# Patient Record
Sex: FEMALE | Race: BLACK OR AFRICAN AMERICAN | URBAN - METROPOLITAN AREA
[De-identification: names, ages, dates, MRNs, and addresses within clinical notes are randomized per-mention and may not be internally consistent; named-entity substitution may affect disease eponyms.]

---

## 2022-04-18 ENCOUNTER — OFFICE VISIT (OUTPATIENT)
Dept: FAMILY MEDICINE CLINIC | Facility: CLINIC | Age: 32
End: 2022-04-18
Payer: COMMERCIAL

## 2022-04-18 VITALS
HEART RATE: 76 BPM | BODY MASS INDEX: 21.35 KG/M2 | HEIGHT: 67 IN | OXYGEN SATURATION: 98 % | DIASTOLIC BLOOD PRESSURE: 72 MMHG | RESPIRATION RATE: 19 BRPM | SYSTOLIC BLOOD PRESSURE: 125 MMHG | WEIGHT: 136 LBS

## 2022-04-18 DIAGNOSIS — F10.10 ALCOHOL ABUSE: ICD-10-CM

## 2022-04-18 DIAGNOSIS — Z00.00 ANNUAL PHYSICAL EXAM: Primary | ICD-10-CM

## 2022-04-18 DIAGNOSIS — F41.9 ANXIETY: ICD-10-CM

## 2022-04-18 LAB
ALBUMIN SERPL-MCNC: 3.8 G/DL (ref 3.4–5)
ALBUMIN/GLOB SERPL: 0.9 {RATIO} (ref 1–2)
ALP LIVER SERPL-CCNC: 60 U/L
ALT SERPL-CCNC: 21 U/L
ANION GAP SERPL CALC-SCNC: 7 MMOL/L (ref 0–18)
AST SERPL-CCNC: 20 U/L (ref 15–37)
BILIRUB SERPL-MCNC: 0.5 MG/DL (ref 0.1–2)
BUN BLD-MCNC: 7 MG/DL (ref 7–18)
BUN/CREAT SERPL: 8.4 (ref 10–20)
CALCIUM BLD-MCNC: 9.2 MG/DL (ref 8.5–10.1)
CHLORIDE SERPL-SCNC: 109 MMOL/L (ref 98–112)
CHOLEST SERPL-MCNC: 271 MG/DL (ref ?–200)
CO2 SERPL-SCNC: 27 MMOL/L (ref 21–32)
CREAT BLD-MCNC: 0.83 MG/DL
FASTING PATIENT LIPID ANSWER: NO
FASTING STATUS PATIENT QL REPORTED: NO
GLOBULIN PLAS-MCNC: 4.2 G/DL (ref 2.8–4.4)
GLUCOSE BLD-MCNC: 75 MG/DL (ref 70–99)
HDLC SERPL-MCNC: 125 MG/DL (ref 40–59)
LDLC SERPL CALC-MCNC: 133 MG/DL (ref ?–100)
NONHDLC SERPL-MCNC: 146 MG/DL (ref ?–130)
OSMOLALITY SERPL CALC.SUM OF ELEC: 293 MOSM/KG (ref 275–295)
POTASSIUM SERPL-SCNC: 3.9 MMOL/L (ref 3.5–5.1)
PROT SERPL-MCNC: 8 G/DL (ref 6.4–8.2)
SODIUM SERPL-SCNC: 143 MMOL/L (ref 136–145)
TRIGL SERPL-MCNC: 81 MG/DL (ref 30–149)
VLDLC SERPL CALC-MCNC: 15 MG/DL (ref 0–30)

## 2022-04-18 PROCEDURE — 80053 COMPREHEN METABOLIC PANEL: CPT | Performed by: FAMILY MEDICINE

## 2022-04-18 PROCEDURE — 80061 LIPID PANEL: CPT | Performed by: FAMILY MEDICINE

## 2022-04-18 NOTE — PROGRESS NOTES
HPI:    Justin Jefferson is a 36year old female presents to clinic as a new patient to establish care and for annual physical exam.  No acute concerns. Normal appetite, tries eat healthy foods. Normal bowel movements and urination. Normal sleep habits. Exercises regularly. Drinks 4-5 shots of alcohol per evening. Reports this started after the loss of her grandmother, also states it helps with work stress. Denies tobacco or illicit drug use. Patient's last menstrual period was 04/15/2022 (exact date). Regular cycles, no concerns. Not sexually active. Has not had a Pap smear. Works at [employer]. HISTORY:  History reviewed. No pertinent past medical history. History reviewed. No pertinent surgical history. History reviewed. No pertinent family history. Social History: Social History    Tobacco Use      Smoking status: Former Smoker        Quit date: 2022        Years since quittin.0      Smokeless tobacco: Former User    Vaping Use      Vaping Use: Every day        Substances: Flavoring    Alcohol use: Yes      Alcohol/week: 30.0 standard drinks      Types: 30 Shots of liquor per week      Comment: Everyday Drinker    Drug use: Not Currently      Types: Cannabis       Medications (Active prior to today's visit):  No current outpatient medications on file. Allergies:  Not on File      Depression Screening (PHQ-2/PHQ-9): Over the LAST 2 WEEKS   Little interest or pleasure in doing things: Not at all    Feeling down, depressed, or hopeless: Not at all    PHQ-2 SCORE: 0           ROS:   Review of Systems   All other systems reviewed and are negative. PHYSICAL EXAM:      22  1606   BP: 125/72   BP Location: Right arm   Patient Position: Sitting   Pulse: 76   Resp: 19   SpO2: 98%   Weight: 136 lb (61.7 kg)   Height: 5' 7\" (1.702 m)     Physical Exam  Vitals reviewed. Constitutional:       General: She is not in acute distress.   HENT:      Head: Normocephalic and atraumatic. Right Ear: Tympanic membrane, ear canal and external ear normal.      Left Ear: Tympanic membrane, ear canal and external ear normal.      Nose: Nose normal.      Mouth/Throat:      Pharynx: Uvula midline. Eyes:      Conjunctiva/sclera: Conjunctivae normal.      Pupils: Pupils are equal, round, and reactive to light. Neck:      Thyroid: No thyromegaly. Cardiovascular:      Rate and Rhythm: Normal rate and regular rhythm. Heart sounds: Normal heart sounds. No murmur heard. Pulmonary:      Effort: Pulmonary effort is normal. No respiratory distress. Breath sounds: Normal breath sounds. No wheezing or rales. Chest:   Breasts:      Right: Normal. No swelling, bleeding, inverted nipple, mass, nipple discharge, skin change or tenderness. Left: Normal. No swelling, bleeding, inverted nipple, mass, nipple discharge, skin change or tenderness. Abdominal:      General: Bowel sounds are normal. There is no distension. Palpations: Abdomen is soft. Tenderness: There is no abdominal tenderness. There is no guarding or rebound. Musculoskeletal:      Cervical back: Normal range of motion and neck supple. Lymphadenopathy:      Cervical: No cervical adenopathy. Neurological:      Mental Status: She is alert. ASSESSMENT/PLAN:   (Z00.00) Annual physical exam  (primary encounter diagnosis)  Plan: TETANUS, DIPHTHERIA TOXOIDS AND ACELLULAR         PERTUSIS VACCINE (TDAP), >7 YEARS, IM USE, COMP        METABOLIC PANEL (14), LIPID PANEL, LIPID PANEL,        COMP METABOLIC PANEL (14)  - Tdap given today. - tobacco, alcohol, illicit drug use discouraged  - safe sexual practices advised  - Reinforced healthy diet, lifestyle, and exercise.   - Past Medical/Social/Family histories reviewed  - Regular dental visits recommended   - Regular eye exams recommended     Follow up in 1 year or sooner if needed      (F10.10) Alcohol abuse  (F41.9) Anxiety  Plan: [facility] NAVIGATOR  -Could benefit from therapy to help with anxiety/excessive alcohol consumption. Yolanda Varela behavioral health navigator referral placed. Responsible party/patient verbalized understanding of information discussed. No barriers to learning observed. Orders This Visit:  Orders Placed This Encounter      Comp Metabolic Panel (14) [E]      Lipid Panel [E]      TETANUS, DIPHTHERIA TOXOIDS AND ACELLULAR PERTUSIS VACCINE (TDAP), >7 YEARS, IM USE      Meds This Visit:  Requested Prescriptions      No prescriptions requested or ordered in this encounter       Imaging & Referrals:   NAVIGATOR  TETANUS, DIPHTHERIA TOXOIDS AND ACELLULAR PERTUSIS VACCINE (TDAP), >7 YEARS, IM USE       The 21st Century cures Act makes medical notes like these available to patients in the interest of transparency. However, be advised that this is a medical document. It is intended as peer to peer communication. It is written in medical language and may contain abbreviations or verbiage that are unfamiliar. It may appear blunt or direct. Medical documents are intended to carry relevant information, facts as evident, and the clinical opinion of the practitioner. This note was created by [employer] voice recognition. Errors in content may be related to improper recognition by the system; efforts to review and correct have been done but errors may still exist. Please contact me with any questions.        4/18/2022  Roque Thompson MD

## 2022-04-25 ENCOUNTER — TELEPHONE (OUTPATIENT)
Dept: FAMILY MEDICINE CLINIC | Facility: CLINIC | Age: 32
End: 2022-04-25

## 2022-04-25 NOTE — TELEPHONE ENCOUNTER
Please assist in contacting this patient to reschedule as we need to cancel her appointment today due to provider illness. Also assist patient to activate her MyChart, thank you.

## 2022-04-25 NOTE — TELEPHONE ENCOUNTER
Tried calling pt not able to leave a voice mail. Per message pt has a voice mail box that is not set up yet. Please, see message below when the call is returned.

## 2022-05-02 ENCOUNTER — OFFICE VISIT (OUTPATIENT)
Dept: FAMILY MEDICINE CLINIC | Facility: CLINIC | Age: 32
End: 2022-05-02
Payer: COMMERCIAL

## 2022-05-02 VITALS
OXYGEN SATURATION: 98 % | SYSTOLIC BLOOD PRESSURE: 122 MMHG | RESPIRATION RATE: 19 BRPM | WEIGHT: 137 LBS | HEIGHT: 67 IN | HEART RATE: 78 BPM | BODY MASS INDEX: 21.5 KG/M2 | DIASTOLIC BLOOD PRESSURE: 78 MMHG

## 2022-05-02 DIAGNOSIS — E78.5 HYPERLIPIDEMIA, UNSPECIFIED HYPERLIPIDEMIA TYPE: ICD-10-CM

## 2022-05-02 DIAGNOSIS — Z12.4 PAP SMEAR FOR CERVICAL CANCER SCREENING: Primary | ICD-10-CM

## 2022-05-02 DIAGNOSIS — F10.20 UNCOMPLICATED ALCOHOL DEPENDENCE (HCC): ICD-10-CM

## 2022-05-02 PROCEDURE — 3078F DIAST BP <80 MM HG: CPT | Performed by: FAMILY MEDICINE

## 2022-05-02 PROCEDURE — 3074F SYST BP LT 130 MM HG: CPT | Performed by: FAMILY MEDICINE

## 2022-05-02 PROCEDURE — 99214 OFFICE O/P EST MOD 30 MIN: CPT | Performed by: FAMILY MEDICINE

## 2022-05-02 PROCEDURE — 3008F BODY MASS INDEX DOCD: CPT | Performed by: FAMILY MEDICINE

## 2022-05-02 NOTE — PROGRESS NOTES
HPI:    Mary Kate Darling is a 36year old female presents to clinic for Pap smear. Has not had a Pap in the past, not sexually active. Also, would like to discuss lab results      HISTORY:  History reviewed. No pertinent past medical history. History reviewed. No pertinent surgical history. History reviewed. No pertinent family history. Social History: Social History    Tobacco Use      Smoking status: Former Smoker        Quit date: 2022        Years since quittin.0      Smokeless tobacco: Former User    Vaping Use      Vaping Use: Every day        Substances: Flavoring    Alcohol use: Yes      Alcohol/week: 30.0 standard drinks      Types: 30 Shots of liquor per week      Comment: Everyday Drinker    Drug use: Not Currently      Types: Cannabis       Medications (Active prior to today's visit):  No current outpatient medications on file. Allergies:  Not on File      Depression Screening (PHQ-2/PHQ-9): Over the LAST 2 WEEKS                         ROS:   Review of Systems   All other systems reviewed and are negative. PHYSICAL EXAM:      22  1102   BP: 122/78   BP Location: Right arm   Patient Position: Sitting   Pulse: 78   Resp: 19   SpO2: 98%   Weight: 137 lb (62.1 kg)   Height: 5' 7\" (1.702 m)     Physical Exam  Vitals reviewed. Constitutional:       General: She is not in acute distress. Genitourinary:     General: Normal vulva. Vagina: Normal. No vaginal discharge. Cervix: Normal.   Neurological:      Mental Status: She is alert. ASSESSMENT/PLAN:   (Z12.4) Pap smear for cervical cancer screening  (primary encounter diagnosis)  Plan: THINPREP PAP WITH HPV REFLEX REQUEST B  -Pap to lab. Safe sexual practices advised. Will await results    (E78.5) Hyperlipidemia, unspecified hyperlipidemia type  Plan:   -Lab results discussed-elevated total/LDL cholesterol. Diet changes discussed-patient eats a lot of fast food, tacos.   Recheck in 6 months.    (F10.20) Uncomplicated alcohol dependence ([facility] [address]  Plan:   -Missed call from [facility] regarding counseling. Has switched from hard alcohol to wine on most nights. Will continue to follow         Responsible party/patient verbalized understanding of information discussed. No barriers to learning observed. Orders This Visit:  Orders Placed This Encounter      ThinPrep PAP with HPV Reflex Request [E]      Meds This Visit:  Requested Prescriptions      No prescriptions requested or ordered in this encounter       Imaging & Referrals:  None       The 21st Century cures Act makes medical notes like these available to patients in the interest of transparency. However, be advised that this is a medical document. It is intended as peer to peer communication. It is written in medical language and may contain abbreviations or verbiage that are unfamiliar. It may appear blunt or direct. Medical documents are intended to carry relevant information, facts as evident, and the clinical opinion of the practitioner. This note was created by [employer] voice recognition. Errors in content may be related to improper recognition by the system; efforts to review and correct have been done but errors may still exist. Please contact me with any questions.        5/2/2022  Jada Harrington MD

## 2022-05-05 LAB — HPV I/H RISK 1 DNA SPEC QL NAA+PROBE: NEGATIVE

## 2024-10-29 ENCOUNTER — HOSPITAL ENCOUNTER (EMERGENCY)
Facility: HOSPITAL | Age: 34
Discharge: HOME | End: 2024-10-29
Attending: EMERGENCY MEDICINE
Payer: COMMERCIAL

## 2024-10-29 ENCOUNTER — APPOINTMENT (OUTPATIENT)
Dept: GENERAL RADIOLOGY | Facility: HOSPITAL | Age: 34
End: 2024-10-29
Payer: COMMERCIAL

## 2024-10-29 VITALS
HEART RATE: 84 BPM | SYSTOLIC BLOOD PRESSURE: 124 MMHG | TEMPERATURE: 97 F | WEIGHT: 136 LBS | OXYGEN SATURATION: 100 % | BODY MASS INDEX: 21.35 KG/M2 | RESPIRATION RATE: 18 BRPM | DIASTOLIC BLOOD PRESSURE: 74 MMHG | HEIGHT: 67 IN

## 2024-10-29 DIAGNOSIS — S63.501A SPRAIN OF RIGHT WRIST, INITIAL ENCOUNTER: Primary | ICD-10-CM

## 2024-10-29 PROCEDURE — 73110 X-RAY EXAM OF WRIST: CPT | Performed by: EMERGENCY MEDICINE

## 2024-10-29 PROCEDURE — 73110 X-RAY EXAM OF WRIST: CPT

## 2024-10-29 PROCEDURE — 99283 EMERGENCY DEPT VISIT LOW MDM: CPT

## 2024-10-29 PROCEDURE — 99284 EMERGENCY DEPT VISIT MOD MDM: CPT

## 2024-10-29 RX ORDER — IBUPROFEN 600 MG/1
600 TABLET, FILM COATED ORAL ONCE
Status: COMPLETED | OUTPATIENT
Start: 2024-10-29 | End: 2024-10-29
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
## 2024-10-29 NOTE — DISCHARGE INSTRUCTIONS
Apply ice to the injured area for 20 minutes at a time over the next 2 days.  Take ibuprofen as needed for pain.  Follow-up with orthopedics if your pain persists beyond the next 3 to 4 days.  Return to the emergency department if increased pain, focal weakness, numbness, or other new symptoms develop.

## 2025-06-06 ENCOUNTER — OFFICE VISIT (OUTPATIENT)
Dept: FAMILY MEDICINE CLINIC | Facility: CLINIC | Age: 35
End: 2025-06-06

## 2025-06-06 ENCOUNTER — LAB ENCOUNTER (OUTPATIENT)
Dept: LAB | Age: 35
End: 2025-06-06
Attending: FAMILY MEDICINE
Payer: COMMERCIAL

## 2025-06-06 VITALS
TEMPERATURE: 97 F | RESPIRATION RATE: 20 BRPM | WEIGHT: 131 LBS | HEART RATE: 80 BPM | BODY MASS INDEX: 20.56 KG/M2 | SYSTOLIC BLOOD PRESSURE: 117 MMHG | OXYGEN SATURATION: 98 % | DIASTOLIC BLOOD PRESSURE: 79 MMHG | HEIGHT: 67 IN

## 2025-06-06 DIAGNOSIS — Z00.00 ANNUAL PHYSICAL EXAM: Primary | ICD-10-CM

## 2025-06-06 DIAGNOSIS — Z00.00 ANNUAL PHYSICAL EXAM: ICD-10-CM

## 2025-06-06 DIAGNOSIS — M54.59 OTHER LOW BACK PAIN: ICD-10-CM

## 2025-06-06 LAB
ALBUMIN SERPL-MCNC: 4.8 G/DL (ref 3.2–4.8)
ALBUMIN/GLOB SERPL: 1.7 {RATIO} (ref 1–2)
ALP LIVER SERPL-CCNC: 49 U/L (ref 37–98)
ALT SERPL-CCNC: 22 U/L (ref 10–49)
ANION GAP SERPL CALC-SCNC: 10 MMOL/L (ref 0–18)
AST SERPL-CCNC: 42 U/L (ref ?–34)
BASOPHILS # BLD AUTO: 0.04 X10(3) UL (ref 0–0.2)
BASOPHILS NFR BLD AUTO: 1.1 %
BILIRUB SERPL-MCNC: 0.7 MG/DL (ref 0.3–1.2)
BUN BLD-MCNC: 6 MG/DL (ref 9–23)
BUN/CREAT SERPL: 6.5 (ref 10–20)
CALCIUM BLD-MCNC: 9.8 MG/DL (ref 8.7–10.4)
CHLORIDE SERPL-SCNC: 105 MMOL/L (ref 98–112)
CO2 SERPL-SCNC: 26 MMOL/L (ref 21–32)
CREAT BLD-MCNC: 0.92 MG/DL (ref 0.55–1.02)
DEPRECATED RDW RBC AUTO: 45.5 FL (ref 35.1–46.3)
EGFRCR SERPLBLD CKD-EPI 2021: 85 ML/MIN/1.73M2 (ref 60–?)
EOSINOPHIL # BLD AUTO: 0.02 X10(3) UL (ref 0–0.7)
EOSINOPHIL NFR BLD AUTO: 0.5 %
ERYTHROCYTE [DISTWIDTH] IN BLOOD BY AUTOMATED COUNT: 15.2 % (ref 11–15)
FASTING STATUS PATIENT QL REPORTED: YES
GLOBULIN PLAS-MCNC: 2.8 G/DL (ref 2–3.5)
GLUCOSE BLD-MCNC: 81 MG/DL (ref 70–99)
HCT VFR BLD AUTO: 28.8 % (ref 35–48)
HGB BLD-MCNC: 8.6 G/DL (ref 12–16)
IMM GRANULOCYTES # BLD AUTO: 0.01 X10(3) UL (ref 0–1)
IMM GRANULOCYTES NFR BLD: 0.3 %
LYMPHOCYTES # BLD AUTO: 1.08 X10(3) UL (ref 1–4)
LYMPHOCYTES NFR BLD AUTO: 29.3 %
MCH RBC QN AUTO: 24.7 PG (ref 26–34)
MCHC RBC AUTO-ENTMCNC: 29.9 G/DL (ref 31–37)
MCV RBC AUTO: 82.8 FL (ref 80–100)
MONOCYTES # BLD AUTO: 0.33 X10(3) UL (ref 0.1–1)
MONOCYTES NFR BLD AUTO: 9 %
NEUTROPHILS # BLD AUTO: 2.2 X10 (3) UL (ref 1.5–7.7)
NEUTROPHILS # BLD AUTO: 2.2 X10(3) UL (ref 1.5–7.7)
NEUTROPHILS NFR BLD AUTO: 59.8 %
OSMOLALITY SERPL CALC.SUM OF ELEC: 289 MOSM/KG (ref 275–295)
PLATELET # BLD AUTO: 483 10(3)UL (ref 150–450)
POTASSIUM SERPL-SCNC: 3.8 MMOL/L (ref 3.5–5.1)
PROT SERPL-MCNC: 7.6 G/DL (ref 5.7–8.2)
RBC # BLD AUTO: 3.48 X10(6)UL (ref 3.8–5.3)
SODIUM SERPL-SCNC: 141 MMOL/L (ref 136–145)
VIT B12 SERPL-MCNC: 700 PG/ML (ref 211–911)
VIT D+METAB SERPL-MCNC: 10.8 NG/ML (ref 30–100)
WBC # BLD AUTO: 3.7 X10(3) UL (ref 4–11)

## 2025-06-06 PROCEDURE — 82607 VITAMIN B-12: CPT

## 2025-06-06 PROCEDURE — 80053 COMPREHEN METABOLIC PANEL: CPT

## 2025-06-06 PROCEDURE — 85025 COMPLETE CBC W/AUTO DIFF WBC: CPT

## 2025-06-06 PROCEDURE — 87086 URINE CULTURE/COLONY COUNT: CPT

## 2025-06-06 PROCEDURE — 82306 VITAMIN D 25 HYDROXY: CPT

## 2025-06-06 PROCEDURE — 36415 COLL VENOUS BLD VENIPUNCTURE: CPT

## 2025-06-06 PROCEDURE — 87186 SC STD MICRODIL/AGAR DIL: CPT

## 2025-06-06 PROCEDURE — 87088 URINE BACTERIA CULTURE: CPT

## 2025-06-06 NOTE — PROGRESS NOTES
Subjective:   Patient ID: Tanner Smith is a 34 year old female.    HPI  Here for annual physical   Overall doing well   Few days ago had some back pain   History/Other:   Review of Systems  Constitutional: Negative.  Negative for activity change, appetite change, diaphoresis and fatigue.     Respiratory: Negative.  Negative for apnea, cough, chest tightness and shortness of breath.    Cardiovascular: Negative.  Negative for chest pain, palpitations and leg swelling.   Gastrointestinal: Negative.  Negative for abdominal pain.   Skin: Negative.           Psychiatric/Behavioral: Negative.        Current Medications[1]  Allergies:Allergies[2]    Objective:   Physical Exam  Constitutional:       Appearance: She is well-developed.   Cardiovascular:      Rate and Rhythm: Normal rate and regular rhythm.      Heart sounds: Normal heart sounds.   Pulmonary:      Effort: Pulmonary effort is normal.      Breath sounds: Normal breath sounds.   Abdominal:      General: Bowel sounds are normal.      Palpations: Abdomen is soft.   Neurological:      Mental Status: She is alert.      Deep Tendon Reflexes: Reflexes are normal and symmetric.         Assessment & Plan:   1. Annual physical exam    2. Other low back pain    Resolved   To call back if it returns     Orders Placed This Encounter   Procedures    Comp Metabolic Panel (14)    CBC With Differential With Platelet    Vitamin D [E]    Vitamin B12    Urine Culture, Routine       Meds This Visit:  Requested Prescriptions      No prescriptions requested or ordered in this encounter       Imaging & Referrals:  None         [1]   No current outpatient medications on file.   [2] Not on File